# Patient Record
Sex: FEMALE | Race: WHITE
[De-identification: names, ages, dates, MRNs, and addresses within clinical notes are randomized per-mention and may not be internally consistent; named-entity substitution may affect disease eponyms.]

---

## 2019-02-04 ENCOUNTER — HOSPITAL ENCOUNTER (OUTPATIENT)
Dept: HOSPITAL 19 - WSPT | Age: 44
LOS: 57 days | Discharge: HOME | End: 2019-04-02
Payer: OTHER GOVERNMENT

## 2019-02-04 DIAGNOSIS — G89.4: ICD-10-CM

## 2019-02-04 DIAGNOSIS — E88.2: Primary | ICD-10-CM

## 2021-07-30 ENCOUNTER — HOSPITAL ENCOUNTER (OUTPATIENT)
Dept: HOSPITAL 19 - WSPT | Age: 46
LOS: 20 days | Discharge: HOME | End: 2021-08-19
Attending: HOSPITAL
Payer: COMMERCIAL

## 2021-07-30 DIAGNOSIS — M54.9: Primary | ICD-10-CM

## 2024-10-09 ENCOUNTER — HOSPITAL ENCOUNTER (EMERGENCY)
Dept: HOSPITAL 19 - COL.ER | Age: 49
Discharge: HOME | End: 2024-10-09
Attending: NURSE PRACTITIONER
Payer: SELF-PAY

## 2024-10-09 VITALS — TEMPERATURE: 98.6 F

## 2024-10-09 VITALS — HEART RATE: 110 BPM | SYSTOLIC BLOOD PRESSURE: 138 MMHG | DIASTOLIC BLOOD PRESSURE: 96 MMHG

## 2024-10-09 VITALS — HEIGHT: 69.02 IN | BODY MASS INDEX: 41.57 KG/M2 | WEIGHT: 280.65 LBS

## 2024-10-09 DIAGNOSIS — R07.9: Primary | ICD-10-CM

## 2024-10-09 DIAGNOSIS — F17.200: ICD-10-CM

## 2024-10-09 LAB
ALBUMIN SERPL-MCNC: 3.5 G/DL (ref 3.5–5)
ALP SERPL-CCNC: 92 U/L (ref 40–150)
ALT SERPL-CCNC: 39 U/L (ref 0–55)
ANION GAP SERPL CALC-SCNC: 12 MMOL/L (ref 7–16)
AST SERPL-CCNC: 25 U/L (ref 5–34)
BASOPHILS # BLD: 0 K/MM3 (ref 0–0.2)
BASOPHILS NFR BLD AUTO: 0.3 % (ref 0–2)
BILIRUB SERPL-MCNC: 0.6 MG/DL (ref 0.2–1.2)
BUN SERPL-MCNC: 12 MG/DL (ref 7–19)
CALCIUM SERPL-MCNC: 9.7 MG/DL (ref 8.4–10.2)
CHLORIDE SERPL-SCNC: 102 MEQ/L (ref 98–107)
CREAT SERPL-SCNC: 0.88 MG/DL (ref 0.57–1.11)
EOSINOPHIL # BLD: 0 K/MM3 (ref 0–0.7)
EOSINOPHIL NFR BLD: 0.4 % (ref 0–4)
ERYTHROCYTE [DISTWIDTH] IN BLOOD BY AUTOMATED COUNT: 13.1 % (ref 11.5–14.5)
GLUCOSE SERPL-MCNC: 123 MG/DL (ref 70–99)
GRANULOCYTES # BLD AUTO: 68.8 % (ref 42.2–75.2)
HCT VFR BLD AUTO: 42 % (ref 37–47)
HGB BLD-MCNC: 14.4 G/DL (ref 12.5–16)
LIPASE SERPL-CCNC: 15 U/L (ref 8–78)
LYMPHOCYTES # BLD: 1.9 K/MM3 (ref 1.2–3.4)
LYMPHOCYTES NFR BLD: 25.2 % (ref 20–51)
MCH RBC QN AUTO: 32 PG (ref 27–31)
MCHC RBC AUTO-ENTMCNC: 34 G/DL (ref 33–37)
MCV RBC AUTO: 93 FL (ref 80–100)
MONOCYTES # BLD: 0.4 K/MM3 (ref 0.1–0.6)
MONOCYTES NFR BLD AUTO: 4.9 % (ref 1.7–9.3)
NEUTROPHILS # BLD: 5.2 K/MM3 (ref 1.4–6.5)
PLATELET # BLD AUTO: 294 K/MM3 (ref 130–400)
PMV BLD AUTO: 9.5 FL (ref 7.4–10.4)
POTASSIUM SERPL-SCNC: 3.9 MEQ/L (ref 3.5–4.5)
PROT SERPL-MCNC: 7 G/DL (ref 6.2–8.1)
RBC # BLD AUTO: 4.51 M/MM3 (ref 4.1–5.3)
SODIUM SERPL-SCNC: 138 MEQ/L (ref 136–145)
TROPONIN I SERPL-MCNC: 0.03 NG/ML (ref 0–0.03)

## 2024-10-21 ENCOUNTER — HOSPITAL ENCOUNTER (OUTPATIENT)
Dept: HOSPITAL 19 - COL.CAR | Age: 49
LOS: 1 days | Discharge: HOME | End: 2024-10-22
Attending: INTERNAL MEDICINE
Payer: SELF-PAY

## 2024-10-21 VITALS
HEART RATE: 96 BPM | SYSTOLIC BLOOD PRESSURE: 129 MMHG | OXYGEN SATURATION: 98 % | TEMPERATURE: 98 F | DIASTOLIC BLOOD PRESSURE: 82 MMHG

## 2024-10-21 VITALS — OXYGEN SATURATION: 97 %

## 2024-10-21 VITALS — OXYGEN SATURATION: 94 %

## 2024-10-21 VITALS
HEART RATE: 82 BPM | SYSTOLIC BLOOD PRESSURE: 135 MMHG | OXYGEN SATURATION: 98 % | DIASTOLIC BLOOD PRESSURE: 84 MMHG | TEMPERATURE: 97.9 F

## 2024-10-21 VITALS — OXYGEN SATURATION: 98 %

## 2024-10-21 VITALS — HEART RATE: 80 BPM | SYSTOLIC BLOOD PRESSURE: 126 MMHG | DIASTOLIC BLOOD PRESSURE: 80 MMHG | TEMPERATURE: 97.7 F

## 2024-10-21 VITALS — OXYGEN SATURATION: 100 %

## 2024-10-21 VITALS — OXYGEN SATURATION: 99 %

## 2024-10-21 VITALS — SYSTOLIC BLOOD PRESSURE: 128 MMHG | DIASTOLIC BLOOD PRESSURE: 84 MMHG | TEMPERATURE: 97.7 F | HEART RATE: 76 BPM

## 2024-10-21 VITALS
DIASTOLIC BLOOD PRESSURE: 100 MMHG | OXYGEN SATURATION: 100 % | HEART RATE: 87 BPM | TEMPERATURE: 97.8 F | SYSTOLIC BLOOD PRESSURE: 153 MMHG

## 2024-10-21 VITALS — OXYGEN SATURATION: 95 %

## 2024-10-21 VITALS — HEART RATE: 84 BPM | DIASTOLIC BLOOD PRESSURE: 93 MMHG | TEMPERATURE: 98 F | SYSTOLIC BLOOD PRESSURE: 152 MMHG

## 2024-10-21 VITALS — OXYGEN SATURATION: 96 %

## 2024-10-21 VITALS — SYSTOLIC BLOOD PRESSURE: 152 MMHG | HEART RATE: 101 BPM | TEMPERATURE: 97.8 F | DIASTOLIC BLOOD PRESSURE: 93 MMHG

## 2024-10-21 VITALS — HEART RATE: 74 BPM | TEMPERATURE: 97.7 F | SYSTOLIC BLOOD PRESSURE: 138 MMHG | DIASTOLIC BLOOD PRESSURE: 87 MMHG

## 2024-10-21 VITALS
SYSTOLIC BLOOD PRESSURE: 117 MMHG | TEMPERATURE: 98 F | HEART RATE: 86 BPM | OXYGEN SATURATION: 97 % | DIASTOLIC BLOOD PRESSURE: 66 MMHG

## 2024-10-21 VITALS — TEMPERATURE: 98.3 F | DIASTOLIC BLOOD PRESSURE: 90 MMHG | SYSTOLIC BLOOD PRESSURE: 127 MMHG | HEART RATE: 78 BPM

## 2024-10-21 VITALS
SYSTOLIC BLOOD PRESSURE: 124 MMHG | DIASTOLIC BLOOD PRESSURE: 79 MMHG | TEMPERATURE: 97.9 F | OXYGEN SATURATION: 98 % | HEART RATE: 73 BPM

## 2024-10-21 VITALS — WEIGHT: 293 LBS | BODY MASS INDEX: 43.4 KG/M2 | HEIGHT: 69.02 IN

## 2024-10-21 VITALS
TEMPERATURE: 97.8 F | SYSTOLIC BLOOD PRESSURE: 153 MMHG | HEART RATE: 110 BPM | OXYGEN SATURATION: 96 % | DIASTOLIC BLOOD PRESSURE: 102 MMHG

## 2024-10-21 VITALS — OXYGEN SATURATION: 89 %

## 2024-10-21 VITALS — TEMPERATURE: 98 F | HEART RATE: 85 BPM | DIASTOLIC BLOOD PRESSURE: 75 MMHG | SYSTOLIC BLOOD PRESSURE: 134 MMHG

## 2024-10-21 VITALS — OXYGEN SATURATION: 93 %

## 2024-10-21 DIAGNOSIS — Z79.899: ICD-10-CM

## 2024-10-21 DIAGNOSIS — I10: ICD-10-CM

## 2024-10-21 DIAGNOSIS — Z79.82: ICD-10-CM

## 2024-10-21 DIAGNOSIS — I25.10: Primary | ICD-10-CM

## 2024-10-21 DIAGNOSIS — G47.33: ICD-10-CM

## 2024-10-21 DIAGNOSIS — I25.82: ICD-10-CM

## 2024-10-21 LAB
ANION GAP SERPL CALC-SCNC: 10 MMOL/L (ref 7–16)
APTT PPP: 31.8 SECONDS (ref 26–37)
BUN SERPL-MCNC: 13 MG/DL (ref 7–19)
CALCIUM SERPL-MCNC: 9.3 MG/DL (ref 8.4–10.2)
CHLORIDE SERPL-SCNC: 106 MEQ/L (ref 98–107)
CREAT SERPL-SCNC: 0.86 MG/DL (ref 0.57–1.11)
ERYTHROCYTE [DISTWIDTH] IN BLOOD BY AUTOMATED COUNT: 13.3 % (ref 11.5–14.5)
GLUCOSE SERPL-MCNC: 98 MG/DL (ref 70–99)
HCT VFR BLD AUTO: 38.2 % (ref 37–47)
HGB BLD-MCNC: 13.1 G/DL (ref 12.5–16)
INR BLD: 1.2 (ref 0.8–3)
MCH RBC QN AUTO: 32 PG (ref 27–31)
MCHC RBC AUTO-ENTMCNC: 34 G/DL (ref 33–37)
MCV RBC AUTO: 92 FL (ref 80–100)
PLATELET # BLD AUTO: 280 K/MM3 (ref 130–400)
PMV BLD AUTO: 9 FL (ref 7.4–10.4)
POTASSIUM SERPL-SCNC: 3.6 MEQ/L (ref 3.5–4.5)
PROTHROMBIN TIME: 12.7 SECONDS (ref 9.7–12.8)
RBC # BLD AUTO: 4.16 M/MM3 (ref 4.1–5.3)
SODIUM SERPL-SCNC: 138 MEQ/L (ref 136–145)

## 2024-10-21 PROCEDURE — C9600 PERC DRUG-EL COR STENT SING: HCPCS

## 2024-10-21 PROCEDURE — C1874 STENT, COATED/COV W/DEL SYS: HCPCS

## 2024-10-21 PROCEDURE — C9601 PERC DRUG-EL COR STENT BRAN: HCPCS

## 2024-10-21 PROCEDURE — C1887 CATHETER, GUIDING: HCPCS

## 2024-10-21 PROCEDURE — C1725 CATH, TRANSLUMIN NON-LASER: HCPCS

## 2024-10-21 PROCEDURE — C1769 GUIDE WIRE: HCPCS

## 2024-10-21 NOTE — NUR
Dave is transferred to  ICU 8 after C with DR. Jensen.  4 drug eluting
stents were  placed. Please see merge document in paper chart for size and
placement of these stents. Dave is awake and alert, tearful, with  reg and
unlabored respirations.  She does continue to report some chest pressure, but
states it is much better than it was during  procedure.  BS
report and handoff of care to Roxi RN in ICU.

## 2024-10-21 NOTE — NUR
Received report from EMIR Diane. Pt is alert and resting in bed with family at
bedside. Pt's vitals are stable at this time. Nitro and IVF's are running at
this time. the radial band is on with all of the air in the band at this time.
Will continue with pt care.

## 2024-10-22 VITALS — OXYGEN SATURATION: 94 %

## 2024-10-22 VITALS — OXYGEN SATURATION: 98 %

## 2024-10-22 VITALS — OXYGEN SATURATION: 95 %

## 2024-10-22 VITALS — OXYGEN SATURATION: 99 %

## 2024-10-22 VITALS — OXYGEN SATURATION: 97 %

## 2024-10-22 VITALS — OXYGEN SATURATION: 100 %

## 2024-10-22 VITALS — OXYGEN SATURATION: 91 %

## 2024-10-22 VITALS — OXYGEN SATURATION: 96 %

## 2024-10-22 VITALS — OXYGEN SATURATION: 90 %

## 2024-10-22 VITALS — OXYGEN SATURATION: 92 %

## 2024-10-22 VITALS
TEMPERATURE: 98 F | DIASTOLIC BLOOD PRESSURE: 84 MMHG | OXYGEN SATURATION: 98 % | SYSTOLIC BLOOD PRESSURE: 146 MMHG | HEART RATE: 86 BPM

## 2024-10-22 VITALS — OXYGEN SATURATION: 93 %

## 2024-10-22 VITALS — OXYGEN SATURATION: 89 %

## 2024-10-22 VITALS
TEMPERATURE: 98 F | OXYGEN SATURATION: 99 % | HEART RATE: 78 BPM | DIASTOLIC BLOOD PRESSURE: 89 MMHG | SYSTOLIC BLOOD PRESSURE: 130 MMHG

## 2024-10-22 VITALS — SYSTOLIC BLOOD PRESSURE: 146 MMHG | TEMPERATURE: 98 F | DIASTOLIC BLOOD PRESSURE: 102 MMHG | HEART RATE: 103 BPM

## 2024-10-22 LAB
ANION GAP SERPL CALC-SCNC: 10 MMOL/L (ref 7–16)
BASOPHILS # BLD: 0 K/MM3 (ref 0–0.2)
BASOPHILS NFR BLD AUTO: 0.1 % (ref 0–2)
BUN SERPL-MCNC: 10 MG/DL (ref 7–19)
CALCIUM SERPL-MCNC: 8.9 MG/DL (ref 8.4–10.2)
CHLORIDE SERPL-SCNC: 109 MEQ/L (ref 98–107)
CREAT SERPL-SCNC: 0.82 MG/DL (ref 0.57–1.11)
EOSINOPHIL # BLD: 0 K/MM3 (ref 0–0.7)
EOSINOPHIL NFR BLD: 0.5 % (ref 0–4)
ERYTHROCYTE [DISTWIDTH] IN BLOOD BY AUTOMATED COUNT: 13.3 % (ref 11.5–14.5)
GLUCOSE SERPL-MCNC: 135 MG/DL (ref 70–99)
GRANULOCYTES # BLD AUTO: 76.9 % (ref 42.2–75.2)
HCT VFR BLD AUTO: 34.3 % (ref 37–47)
HGB BLD-MCNC: 12.2 G/DL (ref 12.5–16)
LYMPHOCYTES # BLD: 1.3 K/MM3 (ref 1.2–3.4)
LYMPHOCYTES NFR BLD: 17.6 % (ref 20–51)
MCH RBC QN AUTO: 32 PG (ref 27–31)
MCHC RBC AUTO-ENTMCNC: 36 G/DL (ref 33–37)
MCV RBC AUTO: 90 FL (ref 80–100)
MONOCYTES # BLD: 0.3 K/MM3 (ref 0.1–0.6)
MONOCYTES NFR BLD AUTO: 4.6 % (ref 1.7–9.3)
NEUTROPHILS # BLD: 5.6 K/MM3 (ref 1.4–6.5)
PLATELET # BLD AUTO: 257 K/MM3 (ref 130–400)
PMV BLD AUTO: 9.2 FL (ref 7.4–10.4)
POTASSIUM SERPL-SCNC: 3.5 MEQ/L (ref 3.5–4.5)
RBC # BLD AUTO: 3.83 M/MM3 (ref 4.1–5.3)
SODIUM SERPL-SCNC: 140 MEQ/L (ref 136–145)

## 2024-10-22 NOTE — NUR
spoke with Dr. Jensen who will discharge pt today. KOLTON met with pt
and her son, Harry [phone] to discuss intake information. She reports to
live with her two sons, Harry and Victor M [phone] in [place]. Pt
sees ERMIAS Rodriges for PCP needs and obtains medications from [facility] with
no difficulties. She does not have health insurance and reports that she does
 all prescibed meds. SW provided [employer] card for her. Pt reports
having gotten an FAA application and she has a CM in [place] that she will
follow-up with to discuss Medicaid. Pt is independent with ADLS and uses a
CPAP for DME. Pt does not have a DPOA-HC and was provided a copy as she states
her other child is special needs.
 
Discharge Plan: home

## 2024-10-22 NOTE — NUR
Pt had an uneventful night. Pt's vitals were stable throughout the night. Was
able to get the TR band off around 0100 this morning. All 14 mls of air was
removed over time by 0000. Pt denies CP and had a headache for about half of
the night. Tylenol and tramadol was given and that didn't seem to help much.
Nitro was stopped and the pt's headache started to go away. Pt has been
getting up to the toilet frequently throughout the night. 1/2 NS is now off.
So no drips and IVF's are running at this time. Pt denies CP and headache now.
Pt is currently resting in bed with the call light within reach. Will give
report to day shift nurse.

## 2024-10-22 NOTE — NUR
Discharge packet reviewed with patient and family. All questions and concerns
addressed at this time.  INT discontinued and patient escorted out to the ER
entrance and departed with her .

## 2024-10-22 NOTE — NUR
Staff gave patient handout that reviewed
risk factors for heart disease. Covered applicable modifiable risk
factors including the following: tobacco cessation, HTN, hyperlipidemia,
diabetes, overweight/obesity, sedentary lifestyle, and stress/depression.
Patient and staff also review unmonifiable risk factors that have contributed
to CVD- stents.
Patient verbalized understanding. Referral sent to [facility]
with patient's permission.

## 2024-10-22 NOTE — NUR
Initial visit attempt: Patient was with nurse but knew  was available
and thanked her for stopping by and offering God's blessings.  left
her card with Dave.

## 2024-10-22 NOTE — NUR
Patient awake and resting in bed; A&0 X 4 and in no distress. Denies any chest
pain but does say that she just feels tired. VS stable and call light left
within reach.